# Patient Record
Sex: MALE | Race: WHITE
[De-identification: names, ages, dates, MRNs, and addresses within clinical notes are randomized per-mention and may not be internally consistent; named-entity substitution may affect disease eponyms.]

---

## 2018-03-16 ENCOUNTER — HOSPITAL ENCOUNTER (EMERGENCY)
Dept: HOSPITAL 72 - EMR | Age: 63
Discharge: HOME | End: 2018-03-16
Payer: COMMERCIAL

## 2018-03-16 VITALS — DIASTOLIC BLOOD PRESSURE: 89 MMHG | SYSTOLIC BLOOD PRESSURE: 159 MMHG

## 2018-03-16 VITALS — WEIGHT: 175 LBS | BODY MASS INDEX: 27.47 KG/M2 | HEIGHT: 67 IN

## 2018-03-16 VITALS — DIASTOLIC BLOOD PRESSURE: 85 MMHG | SYSTOLIC BLOOD PRESSURE: 138 MMHG

## 2018-03-16 DIAGNOSIS — J45.909: ICD-10-CM

## 2018-03-16 DIAGNOSIS — R21: Primary | ICD-10-CM

## 2018-03-16 PROCEDURE — 99283 EMERGENCY DEPT VISIT LOW MDM: CPT

## 2018-03-16 NOTE — EMERGENCY ROOM REPORT
History of Present Illness


General


Chief Complaint:  Skin Rash/Abscess


Source:  Patient





Present Illness


HPI


62-year-old healthy male presents with a flushing reaction that occurred after 

he ate gummy cannabinoid


He reports his face became red as well as his arms became red lasted for a few 

minutes and then resolved without intervention


He reports he never had itching of this rash, he reports he was never raised, 

he reports he never had oral or genital involvement or palmar or sole i 

involvement


He denies use of any other medications or supplements other than this gummy 

cannabinoid and he reports he's had this gummy before


He thinks he is under a lot of stress and that is what precipitated this 

reaction


He also reports he's had similar reactions due to stress in the past


He never told his throat closing never felt nausea or abdominal pain, never 

felt any swelling anywhere or any wheezing or shortness of breath


Allergies:  


Coded Allergies:  


     BANANA (Verified  Allergy, Unknown, 1/26/16)





Patient History


Past Medical History:  see triage record


Reviewed Nursing Documentation:  PMH: Agreed, PSxH: Agreed





Nursing Documentation-PMH


Past Medical History:  No History, Except For


Hx Asthma:  Yes





Review of Systems


All Other Systems:  negative except mentioned in HPI





Physical Exam





Vital Signs








  Date Time  Temp Pulse Resp B/P (MAP) Pulse Ox O2 Delivery O2 Flow Rate FiO2


 


3/16/18 13:37 97.3 65 19 162/87 97   





 97.3       


 


3/16/18 14:00      Room Air  








Sp02 EP Interpretation:  reviewed, normal


General Appearance:  no apparent distress, alert, non-toxic


Head:  normocephalic


Eyes:  bilateral eye normal inspection, bilateral eye PERRL, bilateral eye EOMI


ENT:  normal ENT inspection, hearing grossly normal, normal pharynx, no 

angioedema, normal voice, moist mucus membranes


Neck:  normal inspection, full range of motion, supple, supple/symm/no masses


Respiratory:  chest non-tender, lungs clear, normal breath sounds, chest 

symmetrical, palpation of chest normal


Cardiovascular #1:  normal peripheral pulses, regular rate, rhythm


Cardiovascular #2:  2+ radial (R), 2+ radial (L)


Gastrointestinal:  normal inspection, non tender, soft, no mass, no guarding, 

no rebound


Rectal:  deferred


Genitourinary:  normal inspection, no CVA tenderness


Musculoskeletal:  back normal, gait/station normal, normal range of motion, non-

tender, no calf tenderness


Neurologic:  alert, responsive, CNs III-XII nml as tested, motor strength/tone 

normal, sensory intact, speech normal


Psychiatric:  judgement/insight normal, memory normal, mood/affect normal, no 

suicidal/homicidal ideation


Skin:  normal color, no rash, warm/dry, normal turgor


Lymphatic:  no adenopathy





Medical Decision Making


Reaction to Intervention:  Improved


Diagnostic Impression:  


 Primary Impression:  


 Rash


ER Course


Patient had complete resolution of symptoms prior to arrival


Onset was about 90 minutes prior to arrival and lasted for only a few minutes


He came for reassurance as the rash had nearly completely resolved by the time 

he even arrived and then was completely gone by the time I was able to see him


I will not treat him as hives or any other allergy mediated reaction, but I did 

recommend he avoid the gummy cannabinoids


It seems he had more of a flushing type reaction due to stress





Last Vital Signs








  Date Time  Temp Pulse Resp B/P (MAP) Pulse Ox O2 Delivery O2 Flow Rate FiO2


 


3/16/18 14:00 97.6 91 14 159/89 100 Room Air  





 97.6       








Status:  improved


Disposition:  HOME, SELF-CARE


Condition:  Improved


Patient Instructions:  MIKY Field M.D Mar 16, 2018 14:19
grossly assessed due to